# Patient Record
Sex: FEMALE | Race: BLACK OR AFRICAN AMERICAN
[De-identification: names, ages, dates, MRNs, and addresses within clinical notes are randomized per-mention and may not be internally consistent; named-entity substitution may affect disease eponyms.]

---

## 2017-01-14 NOTE — ER DOCUMENT REPORT
ED Medical Screen (RME)





- General


Stated Complaint: RIGHT EAR PAIN


Notes: 


Right ear pain and fullness increasing over the last week she has had a roaring 

sound for approximately one year in here last week and had a CT performed


TRAVEL OUTSIDE OF THE [place] IN LAST 30 DAYS: No





- Related Data


Allergies/Adverse Reactions: 


 





oxycodone [Oxycodone] Adverse Reaction (Intermediate, Verified 16 09:38)


 itching











Past Medical History





- Past Medical History


Cardiac Medical History: Reports: Hx Heart Murmur


   Denies: Hx Hypertension


Pulmonary Medical History: Reports: Hx Bronchitis


Renal/ Medical History: Denies: Hx Kidney Stones, Hx Ovarian Cysts, Hx Pelvic 

Inflammatory Disease


Malignancy Medical History: Denies: Hx Breast Cancer, Hx Cervical Cancer, Hx 

Ovarian Cancer


GI Medical History: Reports: Hx Gastroesophageal Reflux Disease - with 

pregnancy.  Denies: Hx Hiatal Hernia, Hx Ulcer


Musculoskeltal Medical History: Denies Hx Fibromyalgia


Psychiatric Medical History: Reports: Hx Anxiety, Hx Depression - with anxiety 


   Denies: Hx Bipolar Disorder, Hx Post Traumatic Stress Disorder, Hx 

Schizophrenia


Traumatic Medical History: Denies: Hx Fractures


Infectious Medical History: Denies: Hx HIV


Past Surgical History: Reports: Hx  Section





- Immunizations


Immunizations up to date: Yes


Hx Diphtheria, Pertussis, Tetanus Vaccination:  - unk

## 2017-01-14 NOTE — ER DOCUMENT REPORT
ED ENT





- General


Chief Complaint: Ear Pain


Stated Complaint: RIGHT EAR PAIN


Time seen by provider: 18:04


Mode of Arrival: Ambulatory


Information source: Patient


TRAVEL OUTSIDE OF THE [place] IN LAST 30 DAYS: No





- HPI


Patient complains to provider of: Ear problem


Onset: Other - pt has heard intermittent "whooshing " sound in her R ear for 

the past year.  Has appt with ENT in the near future.  Denies fever





- Related Data


Allergies/Adverse Reactions: 


 





oxycodone [Oxycodone] Adverse Reaction (Intermediate, Verified 17 17:44)


 itching











Past Medical History





- Social History


Smoking Status: Current Every Day Smoker


Cigarette use (# per day): Yes


Chew tobacco use (# tins/day): No


Smoking Education Provided: Yes


Frequency of alcohol use: None


Drug Abuse: None


Family History: Reviewed & Not Pertinent


Patient has suicidal ideation: No


Patient has homicidal ideation: No





- Past Medical History


Cardiac Medical History: Reports: Hx Heart Murmur


   Denies: Hx Hypertension


Pulmonary Medical History: Reports: Hx Bronchitis


Renal/ Medical History: Denies: Hx Kidney Stones, Hx Ovarian Cysts, Hx 

Peritoneal Dialysis, Hx Pelvic Inflammatory Disease


Malignancy Medical History: Denies: Hx Breast Cancer, Hx Cervical Cancer, Hx 

Ovarian Cancer


GI Medical History: Reports: Hx Gastroesophageal Reflux Disease - with 

pregnancy.  Denies: Hx Hiatal Hernia, Hx Ulcer


Musculoskeltal Medical History: Denies Hx Fibromyalgia


Psychiatric Medical History: Reports: Hx Anxiety, Hx Depression - with anxiety 


   Denies: Hx Bipolar Disorder, Hx Post Traumatic Stress Disorder, Hx 

Schizophrenia


Traumatic Medical History: Denies: Hx Fractures


Infectious Medical History: Denies: Hx HIV


Past Surgical History: Reports: Hx  Section





- Immunizations


Immunizations up to date: Yes


Hx Diphtheria, Pertussis, Tetanus Vaccination:  - unk





Review of Systems





- Review of Systems


Constitutional: No symptoms reported


EENT: See HPI, Ear pain


Cardiovascular: No symptoms reported


Respiratory: No symptoms reported


Gastrointestinal: No symptoms reported


-: Yes All other systems reviewed and negative





Physical Exam





- General


General appearance: Appears well


In distress: None





- HEENT


Ears: Other - L TM is nl.  R TM is red, dull, with loss of landmarks


Pharynx: Normal


Neck: Normal





- Respiratory


Respiratory status: No respiratory distress


Breath sounds: Normal





- Cardiovascular


Rhythm: Regular


Heart sounds: Normal auscultation





Discharge





- Discharge


Clinical Impression: 


Otitis media


Qualifiers:


 Otitis media type: unspecified Laterality: right Chronicity: unspecified 

Qualified Code(s): H66.91 - Otitis media, unspecified, right ear





Condition: Stable


Disposition: HOME, SELF-CARE


Additional Instructions: 


rest, take meds as prescribed, return if worse


Prescriptions: 


Amoxicillin Trihydrate [Amoxil 500 mg Capsule] 500 mg PO TID #30 capsule


Referrals: 


JOSE NGUYEN MD [ACTIVE STAFF] - Follow up as needed

## 2018-01-29 NOTE — ER DOCUMENT REPORT
ED General





- General


Chief Complaint: Neck Pain >24hrs old


Stated Complaint: NECK PAIN


Time Seen by Provider: 18 03:23


Notes: 





Patient is a 32-year-old female who presents with 2 days of intermittent right-

sided neck pain.  She does describe it as a intermittent, sharp, stabbing pain.

  Nothing triggers the pain that does resolve spontaneously.  She notes that 

she often feels very anxious when this pain is present.  She has had similar 

symptoms in the past in the setting of high level of anxiety.  At that time she 

had imaging of her neck and labs performed which were unremarkable.  She notes 

that she continues recurrently get this symptom when she feels very stressed or 

anxious.  She denies any difficulty swallowing, difficulty breathing, vomiting, 

or sensation of neck swelling.  She has not seen her primary doctor regarding 

today's concerns.


TRAVEL OUTSIDE OF THE [place] IN LAST 30 DAYS: No





- Related Data


Allergies/Adverse Reactions: 


 





oxycodone [Oxycodone] Adverse Reaction (Intermediate, Verified 18 02:54)


 itching











Past Medical History





- General


Information source: Patient





- Social History


Smoking Status: Never Smoker


Frequency of alcohol use: None


Drug Abuse: None


Lives with: Family


Family History: Reviewed & Not Pertinent





- Past Medical History


Cardiac Medical History: Reports: Hx Heart Murmur


   Denies: Hx Hypertension


Pulmonary Medical History: Reports: Hx Bronchitis


Renal/ Medical History: Denies: Hx Kidney Stones, Hx Ovarian Cysts, Hx 

Peritoneal Dialysis, Hx Pelvic Inflammatory Disease


Malignancy Medical History: Denies: Hx Breast Cancer, Hx Cervical Cancer, Hx 

Ovarian Cancer


GI Medical History: Reports: Hx Gastroesophageal Reflux Disease - with 

pregnancy.  Denies: Hx Hiatal Hernia, Hx Ulcer


Musculoskeltal Medical History: Denies Hx Fibromyalgia


Psychiatric Medical History: Reports: Hx Anxiety, Hx Depression - with anxiety 


   Denies: Hx Bipolar Disorder, Hx Post Traumatic Stress Disorder, Hx 

Schizophrenia


Traumatic Medical History: Denies: Hx Fractures


Infectious Medical History: Denies: Hx HIV


Past Surgical History: Reports: Hx  Section





- Immunizations


Immunizations up to date: Yes


Hx Diphtheria, Pertussis, Tetanus Vaccination:  - unk





Review of Systems





- Review of Systems


Notes: 





Constitutional: Negative for fever.


HENT: Negative for sore throat.


Eyes: Negative for visual changes.


Cardiovascular: Negative for chest pain.


Respiratory: Negative for shortness of breath.


Gastrointestinal: Negative for abdominal pain, vomiting or diarrhea.


Genitourinary: Negative for dysuria.


Musculoskeletal: Negative for back pain.


Skin: Negative for rash.


Neurological: Negative for headaches, weakness or numbness.





10 point ROS negative except as marked above and in HPI.





Physical Exam





- Vital signs


Vitals: 


 











Temp Pulse Resp BP Pulse Ox


 


 98.4 F   72   16   136/95 H  100 


 


 18 02:59  18 02:59  18 02:59  18 02:59  18 02:59











Interpretation: Normal


Notes: 





PHYSICAL EXAMINATION:





GENERAL: Well-appearing, well-nourished and in no acute distress.





HEAD: Atraumatic, normocephalic.





EYES: Pupils equal round and reactive to light, extraocular movements intact, 

sclera anicteric, conjunctiva are normal.





ENT: nares patent, oropharynx clear without exudates.  Moist mucous membranes.





NECK: Supple without lymphadenopathy, no neck swelling, no limited range of 

motion, no meningismus





LUNGS: Breath sounds clear to auscultation bilaterally and equal.  No wheezes 

rales or rhonchi.





HEART: Regular rate and rhythm without murmurs





ABDOMEN: Soft, nontender, normoactive bowel sounds.  No guarding, no rebound.  

No masses appreciated.





EXTREMITIES: Normal range of motion, no pitting or edema.  No cyanosis.





NEUROLOGICAL: No focal neurological deficits. Moves all extremities 

spontaneously and on command.





PSYCH: Normal mood, normal affect.





SKIN: Warm, Dry, normal turgor, no rashes or lesions noted.





Course





- Re-evaluation


Re-evalutation: 





18 04:11


Patient presents with intermittent right-sided neck pain without any obvious 

clinical findings on exam.  Her clinical history does not suggest an acute 

Ridge's angina, retropharyngeal abscess, peritonsillar abscess, mass of the 

neck, or a thyroid mass.  She only intermittently has a symptoms and is 

typically associated with periods of high anxiety and stress.  Patient does 

relate a long-standing history of severe daily anxiety that has been untreated.

  She is agreeable starting fluoxetine as an outpatient.  No indication for 

imaging or labs based on benign exam, vitals and history.  At this time will 

discharge with return precautions and follow-up recommendations.  Verbal 

discharge instructions given a the bedside and opportunity for questions given. 

Medication warnings reviewed. Patient is in agreement with this plan and has 

verbalized understanding of return precautions and the need for primary care 

follow-up in the next 24-72 hours.





- Vital Signs


Vital signs: 


 











Temp Pulse Resp BP Pulse Ox


 


 98.4 F   72   16   136/95 H  100 


 


 18 02:59  18 02:59  18 02:59  18 02:59  18 02:59














Discharge





- Discharge


Clinical Impression: 


 Neck pain, Anxiety





Condition: Good


Disposition: HOME, SELF-CARE


Additional Instructions: 


Your pain is likely secondary to a physical manifestation of your underlying 

stress.  Please start taking the fluoxetine has been prescribed and follow-up 

with your primary doctor for continuation of this medication.  Never abruptly 

stop this medication.  Return if you have worsening of your pain, weakness, 

numbness, difficulty swallowing, difficulty breathing, or any other symptoms 

that are worrisome to you.


Prescriptions: 


Fluoxetine HCl 20 mg PO DAILY #30 tablet

## 2018-02-08 ENCOUNTER — HOSPITAL ENCOUNTER (EMERGENCY)
Dept: HOSPITAL 62 - ER | Age: 33
Discharge: HOME | End: 2018-02-08
Payer: SELF-PAY

## 2018-02-08 VITALS — DIASTOLIC BLOOD PRESSURE: 87 MMHG | SYSTOLIC BLOOD PRESSURE: 143 MMHG

## 2018-02-08 DIAGNOSIS — M79.1: Primary | ICD-10-CM

## 2018-02-08 DIAGNOSIS — M54.2: ICD-10-CM

## 2018-02-08 DIAGNOSIS — Z71.6: ICD-10-CM

## 2018-02-08 DIAGNOSIS — F17.210: ICD-10-CM

## 2018-02-08 PROCEDURE — 99283 EMERGENCY DEPT VISIT LOW MDM: CPT

## 2018-02-08 PROCEDURE — 99406 BEHAV CHNG SMOKING 3-10 MIN: CPT

## 2018-02-08 NOTE — ER DOCUMENT REPORT
ED Neck/Back Problem





- General


Chief Complaint: Neck Pain >24hrs old


Stated Complaint: NECK PAIN


Time Seen by Provider: 18 06:33


Mode of Arrival: Ambulatory


Information source: Patient


Notes: 





32-year-old female presented to ED for complaint of neck pain.  She states she 

was seen here on 2018 for the same neck pain and did not get any better.  

She states she does not remember any injuries and that the pain comes and goes.

  She states she was given Prozac for her neck pain before.  She was states she 

was told it was stress.


TRAVEL OUTSIDE OF THE [place] IN LAST 30 DAYS: No





- HPI


Patient complains to provider of: Neck


Onset: Other - Since 2018


Onset: Gradual


Timing: Waxing and waning


Quality of pain: Sharp


Severity: Mild


Recent injury: No


Associated symptoms: Like prior neck/back pain


Exacerbated by: Nothing


Relieved by: Nothing


Similar symptoms previously: Yes


Recently seen / treated by doctor: Yes





- Related Data


Allergies/Adverse Reactions: 


 





oxycodone [Oxycodone] Adverse Reaction (Intermediate, Verified 18 02:54)


 itching











Past Medical History





- General


Information source: Patient





- Social History


Smoking Status: Current Every Day Smoker


Cigarette use (# per day): Yes - Pack per day


Chew tobacco use (# tins/day): No


Smoking Education Provided: Yes - 4 minutes


Frequency of alcohol use: None


Drug Abuse: None


Occupation: [employer]


Lives with: Family


Family History: Reviewed & Not Pertinent


Patient has suicidal ideation: No


Patient has homicidal ideation: No





- Past Medical History


Cardiac Medical History: Reports: Hx Heart Murmur


Pulmonary Medical History: Reports: Hx Bronchitis


EENT Medical History: Reports: None


Neurological Medical History: Reports: None


Endocrine Medical History: Reports: None


Renal/ Medical History: Reports: None


Malignancy Medical History: Reports: None


GI Medical History: Reports: Hx Gastroesophageal Reflux Disease - with pregnancy


Musculoskeltal Medical History: Reports None


Skin Medical History: Reports None


Psychiatric Medical History: Reports: Hx Anxiety, Hx Depression - with anxiety 


Traumatic Medical History: Reports: None


Infectious Medical History: Reports: None


Past Surgical History: Reports: Hx  Section





- Immunizations


Immunizations up to date: Yes


Hx Diphtheria, Pertussis, Tetanus Vaccination:  - unk





Review of Systems





- Review of Systems


Constitutional: No symptoms reported


EENT: No symptoms reported


Cardiovascular: No symptoms reported


Respiratory: No symptoms reported


Gastrointestinal: No symptoms reported


Genitourinary: No symptoms reported


Female Genitourinary: No symptoms reported


Musculoskeletal: Muscle pain, Muscle stiffness, Neck pain


Skin: No symptoms reported


Hematologic/Lymphatic: No symptoms reported


Neurological/Psychological: No symptoms reported


-: Yes All other systems reviewed and negative





Physical Exam





- Vital signs


Vitals: 


 











Temp Pulse Resp BP Pulse Ox


 


 98.5 F   87   18   143/87 H  100 


 


 18 01:22  18 01:22  18 01:22  18 01:22  18 01:22











Interpretation: Normal





- General


General appearance: Appears well, Alert





- HEENT


Head: Normocephalic, Atraumatic


Eyes: Normal


Pupils: PERRL





- Respiratory


Respiratory status: No respiratory distress


Chest status: Nontender


Breath sounds: Normal


Chest palpation: Normal





- Cardiovascular


Rhythm: Regular


Heart sounds: Normal auscultation


Murmur: No





- Abdominal


Inspection: Normal


Distension: No distension


Bowel sounds: Normal


Tenderness: Nontender


Organomegaly: No organomegaly





- Back


Back: Normal, Tender - Right side of the neck.  No: Deformity/step-off, CVA 

tenderness, Vertebra tenderness, Scars, Scoliosis, Wounds





- Extremities


General upper extremity: Normal inspection, Nontender, Normal color, Normal ROM

, Normal temperature


General lower extremity: Normal inspection, Nontender, Normal color, Normal ROM

, Normal temperature, Normal weight bearing.  No: Kathleen's sign





- Neurological


Neuro grossly intact: Yes


Cognition: Normal


Orientation: AAOx4


[place] Coma Scale Eye Opening: Spontaneous


[place] Coma Scale Verbal: Oriented


[place] Coma Scale Motor: Obeys Commands


[place] Coma Scale Total: 15


Speech: Normal


Motor strength normal: LUE, RUE, LLE, RLE


Sensory: Normal





- Psychological


Associated symptoms: Normal affect, Normal mood





- Skin


Skin Temperature: Warm


Skin Moisture: Dry


Skin Color: Normal





Course





- Re-evaluation


Re-evalutation: 





18 07:41


Patient is here recently for the same pain and was told to stress.  

Instructions on ice heat exercises.  I reviewed use of ibuprofen and Tylenol 

for her pain to use ice packs or warm packs as needed is given shoulder 

exercises to help to relieve the aches from her neck was also instructed to use 

Aspercreme topically for her pain.  Patient was discharged home with a list of 

local doctors follow up with.





- Vital Signs


Vital signs: 


 











Temp Pulse Resp BP Pulse Ox


 


 98.5 F   87   18   143/87 H  100 


 


 18 01:22  18 01:22  18 01:22  18 01:22  18 01:22














Discharge





- Discharge


Clinical Impression: 


 Myalgia





Condition: Stable


Disposition: HOME, SELF-CARE


Instructions:  Family Physicians / Practices


Additional Instructions: 


Myalagia (Muscle Pain)





     Myalgia is pain in the muscles. We use the word myalgia to describe muscle 

pain where there's no history of injury, no known muscle disease, and the 

muscles are normal to examination. Myalgias can be a symptom of an acute illness

, such as influenza, hepatitis, or any viral illness, especially with fever.  

Sometimes the muscle pain comes before any other symptoms. Myalgia can also be 

an early symptom of inflammatory muscle disease, such as lupus.


     If myalgia is accompanied by an acute illness that explains the muscle pain

, then no further testing needs to be done. When there's no clear reason for 

the pain, tests may be done to see if there's an inflammatory or other disease 

of the muscles.


     The usual treatment for myalgias is anti-inflammatory medication, such as 

ibuprofen. Muscle aches may be soothed with a heating pad or hot compress.


     If muscles remain painful for more than a few days, you'll need testing 

and followup. Return if a muscle becomes swollen, red, or severely painful.








USE OF OVER-THE-COUNTER IBUPROFEN:


     Ibuprofen (Advil, Nuprin, Medipren, Motrin IB) is a medication for fever 

and pain control.  In addition, it has anti- inflammatory effects which may be 

beneficial, especially in the treatment of injuries.


     It's best to take ibuprofen with food.  Persons with ulcer disease or 

allergy to aspirin should notify their physician of this before taking 

ibuprofen.


     Ibuprofen can be given every four to six hours, for a total of four doses 

daily.


     Age              Pain or fever dose          Antiinflammatory dose


     6-8 yr              200 mg (1 tab)                200 mg (1 tab)


     9-11 yr             200 mg (1 tab)                200-400 mg (1-2 tab)


     11-14 yr            200-400 mg (1-2 tab)         400 mg (2 tab)


     15-adult            400 mg (2 tab)                600 mg (3 tab)








USE OF TYLENOL (ACETAMINOPHEN):


     Acetaminophen may be taken for pain relief or fever control. It's much 

safer than aspirin, offering a wider range of "safe" dosages.  It is safe 

during pregnancy.  Some brand names are Tylenol, Panadol, Datril, Anacin 3, 

Tempra, and Liquiprin. Acetaminophen can be repeated every four hours.  The 

following are maximum recommended dosages:





WEIGHT         Dose             Drops                  Elixir        Chewable(

80mg)


(LBS.)                            drprs=droppers    tsp=teaspoon


6               40 mg            0.4 ml (1/2)


6-11            80 mg            0.8 ml (full)              tsp               

   1       tab


12-16         120 mg           1 1/2 drprs             3/4  tsp               1 

1/2  tabs


17-23         160 mg             2  drprs             1    tsp                 

  2       tabs


24-30         240 mg             3  drprs             1 1/2 tsp                

3       tabs


30-35         320 mg                                       2    tsp            

       4       tabs


36-41         360 mg                                       2 1/4   tsp         

     4 1/2 tabs


42-47         400 mg                                       2 1/2   tsp         

     5      tabs


48-53         480 mg                                       3    tsp            

       6      tabs


54-59         520 mg                                       3  1/4  tsp         

     6 1/2 tabs


60-64         560 mg                                       3  1/2  tsp         

     7      tabs 


65-70         600 mg                                       3  3/4  tsp         

     7 1/2 tabs


71-76         640 mg                                       4   tsp             

      8      tabs


77-82         720 mg                                       4 1/2   tsp         

    9      tabs


83-88         800 mg                                       5   tsp             

    10      tabs





>89 pounds or adults          650 mg to 900 mg





Acetaminophen can be repeated every four hours.  Maximum dose not to exceed 

4000 mg a day.





   These maximum recommended dosages are slightly higher than the dosages 

written on the product container, but these dosages are very safe and below the 

toxic dosage for acetaminophen.








ICE PACKS:


     Apply ice packs frequently against the painful area.  Many different 

schedules are recommended, such as "20 minutes on, 20 minutes off" or "one hour 

ice, two hours rest."  If you need to work, you may need to go longer between 

ice treatments.  You should plan to have the area ice packed AT LEAST one 

fourth of the time.


     The ice should be applied over the wrap, tape, or splint, or over a layer 

of cloth -- not directly against the skin.  Some ice bags have a built-in cloth 

and can be put directly on the skin.





WARM PACKS:


     After approximately two days, apply gentle heat (such as a heating pad or 

hot water bottle) for about 20 to 30 minutes about every two hours -- at least 

four times daily.  Warmth and elevation will help you make a more rapid recovery

, and will ease the pain considerably.


     Do not use HOT heat, and never apply heat for longer than 30 minutes.  The 

continuous heat can invisibly damage skin and muscles -- even when no burn is 

seen on the surface.  Damaged muscles can make you MORE sore.





Exercise Program for the Shoulder





     Since the shoulder moves in so many directions, the joint attachment is 

weak.  Muscles provide most of the stability to the shoulder.  You must 

exercise your shoulder to prevent painful instability or stiffening.


     PASSIVE - These may be begun within a few days of the injury. While 

standing, lean forward, allowing the arm to hang down towards the floor.  Move 

the arm in small circles while slowly twisting your chest towards and away from 

the hanging arm.  Do this for one minute.


     ACTIVE - These may be performed when the doctor gives permission. Begin 

with the arms at the sides.  Raise the arms forward (shoulder's width apart) 

until they reach shoulder level.  Then slowly swing both arms back until they 

are aiming straight out away from each other. Then bring them forward again, 

and finally, lower them to your sides. Repeat 20 to 30 times.  As you improve, 

put weights in your hands for the exercise.  Start with one pound, and work up 

to 10 pounds.  Never use more than is comfortable.  Athletes may work up to 30 

pounds.








FOLLOW-UP CARE:


If you have been referred to a physician for follow-up care, call the physician

s office for an appointment as you were instructed or within the next two days.

  If you experience worsening or a significant change in your symptoms, notify 

the physician immediately or return to the Emergency Department at any time for 

re-evaluation.


Forms:  Elevated Blood Pressure, Smoking Cessation Education, Return to Work